# Patient Record
Sex: FEMALE | Race: WHITE | NOT HISPANIC OR LATINO | Employment: FULL TIME | URBAN - NONMETROPOLITAN AREA
[De-identification: names, ages, dates, MRNs, and addresses within clinical notes are randomized per-mention and may not be internally consistent; named-entity substitution may affect disease eponyms.]

---

## 2018-08-27 ENCOUNTER — TRANSCRIBE ORDERS (OUTPATIENT)
Dept: ADMINISTRATIVE | Facility: HOSPITAL | Age: 28
End: 2018-08-27

## 2018-08-27 ENCOUNTER — HOSPITAL ENCOUNTER (OUTPATIENT)
Dept: GENERAL RADIOLOGY | Facility: HOSPITAL | Age: 28
Discharge: HOME | End: 2018-08-27
Admitting: NURSE PRACTITIONER

## 2018-08-27 DIAGNOSIS — M54.9 MID BACK PAIN: Primary | ICD-10-CM

## 2018-08-27 DIAGNOSIS — M54.9 MID BACK PAIN: ICD-10-CM

## 2018-08-27 PROCEDURE — 72072 X-RAY EXAM THORAC SPINE 3VWS: CPT

## 2018-08-27 PROCEDURE — 72072 X-RAY EXAM THORAC SPINE 3VWS: CPT | Performed by: RADIOLOGY

## 2018-09-12 ENCOUNTER — TRANSCRIBE ORDERS (OUTPATIENT)
Dept: PHYSICAL THERAPY | Facility: HOSPITAL | Age: 28
End: 2018-09-12

## 2018-09-12 DIAGNOSIS — M54.6 THORACIC BACK PAIN, UNSPECIFIED BACK PAIN LATERALITY, UNSPECIFIED CHRONICITY: Primary | ICD-10-CM

## 2018-09-14 ENCOUNTER — HOSPITAL ENCOUNTER (OUTPATIENT)
Dept: PHYSICAL THERAPY | Facility: HOSPITAL | Age: 28
Setting detail: THERAPIES SERIES
Discharge: HOME | End: 2018-09-14

## 2018-09-14 DIAGNOSIS — M54.6 PAIN IN THORACIC SPINE: Primary | ICD-10-CM

## 2018-09-14 PROCEDURE — 97162 PT EVAL MOD COMPLEX 30 MIN: CPT | Performed by: PHYSICAL THERAPIST

## 2018-09-14 NOTE — THERAPY EVALUATION
"    Outpatient Physical Therapy Ortho Initial Evaluation   J Luis     Patient Name: Iris Jiménez  : 1990  MRN: [id number]  Today's Date: 2018      Visit Date: 2018    There is no problem list on file for this patient.       History reviewed. No pertinent past medical history.     History reviewed. No pertinent surgical history.    Visit Dx:     ICD-10-CM ICD-9-CM   1. Pain in thoracic spine M54.6 724.1             Patient History     Row Name 18 1100             History    Chief Complaint Pain  -AD      Type of Pain Back pain  -AD      Date Current Problem(s) Began --   Approximately one year.  -AD      Brief Description of Current Complaint Pt reported mid back pain beginning approximately one year ago. She reported the pain was initially intermittent but has progressed to a constant ache. She stated she has difficulty with prolonged standing at work and lifting light objects. She stated she had an x-ray performed approximately two weeks ago which was negative for fractures.She stated she has also experienced pain in the left upper extremity which was described as a \"numbness, tingling\".  -AD      Patient/Caregiver Goals Relieve pain;Return to prior level of function;Improve mobility;Improve strength  -AD      Smoking Status Daily smoker  -AD      Patient's Rating of General Health Good  -AD      Hand Dominance right-handed  -AD      Occupation/sports/leisure activities /jhon at [employer].  -AD      Patient seeing anyone else for problem(s)? Physician  -AD      How has patient tried to help current problem? Sitting, resting.  -AD      What clinical tests have you had for this problem? X-ray  -AD      Results of Clinical Tests Negative for fractures.  -AD      Are you or can you be pregnant No  -AD         Pain     Pain Location Back  -AD      Pain at Present 5  -AD      Pain at Best 3  -AD      Pain at Worst 8  -AD      Pain Frequency Constant/continuous  -AD      Pain " Description Aching  -AD      What Performance Factors Make the Current Problem(s) WORSE? Prolonged standing, lifting.  -AD      What Performance Factors Make the Current Problem(s) BETTER? Rest, lying down, sitting.  -AD      Tolerance Time- Standing 30 minutes  -AD      Tolerance Time- Sitting 60 minutes  -AD      Is your sleep disturbed? No  -AD      Is medication used to assist with sleep? No  -AD      Difficulties at work? Prolonged standing, lifting.  -AD      Difficulties with ADL's? No difficulty  -AD         Fall Risk Assessment    Any falls in the past year: Yes  -AD      Number of falls reported in the last 12 months 3  -AD      Factors that contributed to the fall: Lost balance  -AD         Daily Activities    Primary Language English  -AD      Are you able to read Yes  -AD      Are you able to write Yes  -AD      How does patient learn best? Listening  -AD      Pt Participated in POC and Goals Yes  -AD         Safety    Are you being hurt, hit, or frightened by anyone at home or in your life? No  -AD      Are you being neglected by a caregiver No  -AD        User Key  (r) = Recorded By, (t) = Taken By, (c) = Cosigned By    Initials Name Provider Type    AD Dalton, Ashley Claudene, PT Physical Therapist                PT Ortho     Row Name 09/14/18 1100       Posture/Observations    Posture- WNL --   Flexed forward posture.  -AD       DTR- Upper Quarter Clearing    Biceps (C5/6) Bilateral:;2- Normal response  -AD    Brachioradialis (C6) Bilateral:;2- Normal response  -AD    Triceps (C7) Bilateral:;2- Normal response  -AD       Neural Tension Signs- Upper Quarter Clearing    ULNTT 1 Left:;Postive  -AD    ULNTT 2 Left:;Negative  -AD    ULNTT 3 Left:;Postive  -AD    ULNTT 4 Left:;Negative  -AD       Sensory Screen for Light Touch- Upper Quarter Clearing    C4 (posterior shoulder) Bilateral:;Intact  -AD    C5 (lateral upper arm) Bilateral:;Intact  -AD    C6 (tip of thumb) Bilateral:;Intact  -AD    C7 (tip of  3rd finger) Bilateral:;Intact  -AD    C8 (tip of 5th finger) Bilateral:;Intact  -AD    T1 (medial lower arm) Bilateral:;Intact  -AD       Myotomal Screen- Upper Quarter Clearing    Shoulder flexion (C5) Bilateral:;4- (Good -)  -AD    Elbow flexion/wrist extension (C6) Bilateral:;4- (Good -)  -AD    Elbow extension/wrist flexion (C7) Bilateral:;4- (Good -)  -AD       Cervical/Shoulder ROM Screen    Cervical flexion Impaired   30  -AD    Cervical extension Impaired   15  -AD    Cervical lateral flexion --   Right: 30, Left: 20  -AD    Cervical rotation --   60 degrees bilaterally  -AD       DTR- Lower Quarter Clearing    Patellar tendon (L2-4) Bilateral:;2- Normal response  -AD    Achilles tendon (S1-2) Bilateral:;2- Normal response  -AD       Neural Tension Signs- Lower Quarter Clearing    Slump Bilateral:;Negative  -AD    SLR Bilateral:;Negative  -AD       Pathological Reflexes- Lower Quarter Clearing    Clonus Bilateral:;Negative  -AD       Sensory Screen for Light Touch- Lower Quarter Clearing    L1 (inguinal area) Bilateral:;Intact  -AD    L2 (anterior mid thigh) Bilateral:;Intact  -AD    L3 (distal anterior thigh) Bilateral:;Intact  -AD    L4 (medial lower leg/foot) Bilateral:;Intact  -AD    L5 (lateral lower leg/great toe) Bilateral:;Intact  -AD    S1 (bottom of foot) Bilateral:;Intact  -AD       Myotomal Screen- Lower Quarter Clearing    Hip flexion (L2) Bilateral:;4 (Good)  -AD    Knee extension (L3) Bilateral:;4 (Good)  -AD    Ankle DF (L4) Bilateral:;4 (Good)  -AD    Ankle PF (S1) Bilateral:;4 (Good)  -AD    Knee flexion (S2) Bilateral:;4 (Good)  -AD       Lumbar ROM Screen- Lower Quarter Clearing    Lumbar Flexion Normal  -AD    Lumbar Extension --   50%  -AD    Lumbar Lateral Flexion Normal  -AD    Lumbar Rotation Normal  -AD       SI/Hip Screen- Lower Quarter Clearing    ASIS compression Positive  -AD    ASIS distraction Negative  -AD    Miquel's/Alexis's test Bilateral:;Positive  -AD    Posterior thigh  sheer Bilateral:;Positive  -AD       Special Tests/Palpation    Special Tests/Palpation --   T8 tenderness, hypomobility, relieved with joint mob  -AD       Cervical Palpation    Cervical Palpation- Location? Upper traps;Spinous process  -AD    Spinous Process Tender   T8-T10, tender and hypomobile.  -AD    Upper Traps Bilateral:;Tender;Guarded/taut  -AD       Thoracic Accessory Motions    Thoracic Accessory Motions Tested? --   T8 tender, hypomobile  -AD       Cervical/Thoracic Special Tests    Spurlings (Foraminal Compression) Left:;Positive  -AD    Cervical Compression (Forarminal Compression vs. Facet Pain) Positive  -AD    Cervical Distraction (Foraminal Compression vs. Facet Pain) Negative  -AD       Sensation    Sensation WNL? WNL  -AD       Gait/Stairs Assessment/Training    Comment (Gait/Stairs) No gait abnormalities observed.  -AD      User Key  (r) = Recorded By, (t) = Taken By, (c) = Cosigned By    Initials Name Provider Type    AD Dalton, Ashley Claudene, PT Physical Therapist                      Therapy Education  Given: HEP, Symptoms/condition management, Pain management, Posture/body mechanics, Mobility training  Program: New  How Provided: Verbal, Demonstration, Written  Provided to: Patient  Level of Understanding: Teach back education performed, Verbalized, Demonstrated           PT OP Goals     Row Name 09/14/18 1300          PT Short Term Goals    STG Date to Achieve 09/28/18  -AD     STG 1 Pt will be instructed in HEP for improved functional independence.  -AD     STG 1 Progress New  -AD     STG 2 Pt will report a maximum of 5/10 thoracic pain for improved function.  -AD     STG 2 Progress New  -AD     STG 3 Pt will demonstrate a 5 degree improvement in cervical ROM for improved function.  -AD     STG 3 Progress New  -AD        Long Term Goals    LTG Date to Achieve 10/14/18  -AD     LTG 1 Pt will demonstrate 5/5 BUE and BLE strength for improved function.  -AD     LTG 1 Progress New  -AD      LTG 2 Pt will report a 75% improvement in left upper extremity radicular pain for improved function.  -AD     LTG 2 Progress New  -AD     LTG 3 Pt will report less than 15% impairment on the Modified Oswestry for improved functional independence.  -AD     LTG 3 Progress New  -AD     LTG 4 Pt will report mild tenderness to palpation of the thoracic region with mild muscle guarding.  -AD     LTG 4 Progress New  -AD     LTG 5 Pt will report the ability to work a full shift at [employer] with no more than 2/10 back pain.  -AD     LTG 5 Progress New  -AD        Time Calculation    PT Goal Re-Cert Due Date 10/14/18  -AD       User Key  (r) = Recorded By, (t) = Taken By, (c) = Cosigned By    Initials Name Provider Type    AD Dalton, Ashley Claudene, PT Physical Therapist                PT Assessment/Plan     Row Name 09/14/18 1319          PT Assessment    Functional Limitations Decreased safety during functional activities;Limitation in home management;Limitations in community activities;Performance in work activities;Performance in self-care ADL;Performance in leisure activities;Limitations in functional capacity and performance  -AD     Impairments Balance;Joint mobility;Joint integrity;Muscle strength;Pain;Poor body mechanics;Posture;Range of motion;Sensation  -AD     Assessment Comments The patient is a 28 year old female presenting to the clinic with thoracic and left upper extremity pain. The patient denied cervical pain, however upon assessment special tests including Spurlings and ULTT were positive for possible nerve impingement. She demonstrated impaired cervical ROM, bilateral upper and lower extremity weakness, and reported left UE radicular pain.  She was tender to papation of T8 spinous process which was also hypomobile upon assessment. She would benefit from skilled physical therapy to address functional limitations and impairments. She will be progressed as tolerated to improve level of function.  -AD      Please refer to paper survey for additional self-reported information Yes  -AD     Rehab Potential Good  -AD     Patient/caregiver participated in establishment of treatment plan and goals Yes  -AD     Patient would benefit from skilled therapy intervention Yes  -AD        PT Plan    PT Frequency 2x/week  -AD     Predicted Duration of Therapy Intervention (Therapy Eval) 4 weeks  -AD     Planned CPT's? PT EVAL MOD COMPLELITY: 60101;PT RE-EVAL: 97253;PT NEUROMUSC RE-EDUCATION EA 15 MIN: 43468;PT MANUAL THERAPY EA 15 MIN: 09263;PT THER ACT EA 15 MIN: 91726;PT THER PROC EA 15 MIN: 42697;PT SELF CARE/HOME MGMT/TRAIN EA 15: 12451;PT ULTRASOUND EA 15 MIN: 09370;PT ELECTRICAL STIM ATTD EA 15 MIN: 29839;PT ELECTRICAL STIM UNATTEND: ;PT TRACTION CERVICAL: 32176;PT HOT/COLD PACK WC NONMCARE: 81932;PT IONTOPHORESIS EA 15 MIN: 14142;PT THER SUPP EA 15 MIN  -AD     Physical Therapy Interventions (Optional Details) balance training;gross motor skills;home exercise program;patient/family education;neuromuscular re-education;motor coordination training;modalities;manual therapy techniques;lumbar stabilization;joint mobilization;postural re-education;ROM (Range of Motion);strengthening;stretching;transfer training  -AD     PT Plan Comments The above noted interventions will be used to address functional limitations and impairments. She will be progressed as tolerated.  -AD       User Key  (r) = Recorded By, (t) = Taken By, (c) = Cosigned By    Initials Name Provider Type    AD Dalton, Ashley Claudene, RAUL Physical Therapist                  Exercises     Row Name 09/14/18 1300             Exercise 1    Exercise Name 1 HEP: scapular retraction, UT stretch, levator scapula stretch.  -AD      Cueing 1 Verbal;Tactile;Demo  -AD        User Key  (r) = Recorded By, (t) = Taken By, (c) = Cosigned By    Initials Name Provider Type    AD Dalton, Ashley Claudene, RAUL Physical Therapist                        Outcome Measure Options: Modifed  Owestry  Modified Oswestry  Modified Oswestry Score/Comments: 14/50 = 28% impaired      Time Calculation:     Therapy Suggested Charges     Code   Minutes Charges    None             Start Time: 1100  Stop Time: 1200  Time Calculation (min): 60 min     Therapy Charges for Today     Code Description Service Date Service Provider Modifiers Qty    [id number] HC PT EVAL MOD COMPLEXITY 4 9/14/2018 Dalton, Ashley Claudene, PT GP 1          PT G-Codes  Outcome Measure Options: Modifed Owestry  Modified Oswestry Score/Comments: 14/50 = 28% impaired         Ashley Claudene Dalton, PT  9/14/2018

## 2018-09-21 ENCOUNTER — APPOINTMENT (OUTPATIENT)
Dept: PHYSICAL THERAPY | Facility: HOSPITAL | Age: 28
End: 2018-09-21

## 2022-05-14 ENCOUNTER — HOSPITAL ENCOUNTER (EMERGENCY)
Facility: HOSPITAL | Age: 32
Discharge: HOME | End: 2022-05-14
Admitting: EMERGENCY MEDICINE

## 2022-05-14 VITALS
SYSTOLIC BLOOD PRESSURE: 130 MMHG | OXYGEN SATURATION: 100 % | DIASTOLIC BLOOD PRESSURE: 82 MMHG | WEIGHT: 199 LBS | BODY MASS INDEX: 33.15 KG/M2 | HEIGHT: 65 IN | TEMPERATURE: 97.8 F | HEART RATE: 80 BPM | RESPIRATION RATE: 16 BRPM

## 2022-05-14 DIAGNOSIS — H66.003 ACUTE SUPPURATIVE OTITIS MEDIA OF BOTH EARS WITHOUT SPONTANEOUS RUPTURE OF TYMPANIC MEMBRANES, RECURRENCE NOT SPECIFIED: Primary | ICD-10-CM

## 2022-05-14 PROCEDURE — 25010000002 KETOROLAC TROMETHAMINE PER 15 MG: Performed by: PHYSICIAN ASSISTANT

## 2022-05-14 PROCEDURE — 25010000002 CEFTRIAXONE PER 250 MG: Performed by: PHYSICIAN ASSISTANT

## 2022-05-14 PROCEDURE — 96372 THER/PROPH/DIAG INJ SC/IM: CPT

## 2022-05-14 PROCEDURE — 99283 EMERGENCY DEPT VISIT LOW MDM: CPT

## 2022-05-14 RX ORDER — IBUPROFEN 800 MG/1
800 TABLET ORAL EVERY 6 HOURS PRN
Qty: 30 TABLET | Refills: 0 | Status: SHIPPED | OUTPATIENT
Start: 2022-05-14

## 2022-05-14 RX ORDER — CEFDINIR 300 MG/1
300 CAPSULE ORAL 2 TIMES DAILY
Qty: 20 CAPSULE | Refills: 0 | Status: SHIPPED | OUTPATIENT
Start: 2022-05-14 | End: 2022-05-24

## 2022-05-14 RX ORDER — KETOROLAC TROMETHAMINE 30 MG/ML
60 INJECTION, SOLUTION INTRAMUSCULAR; INTRAVENOUS ONCE
Status: COMPLETED | OUTPATIENT
Start: 2022-05-14 | End: 2022-05-14

## 2022-05-14 RX ADMIN — LIDOCAINE HYDROCHLORIDE 1 G: 10 INJECTION, SOLUTION EPIDURAL; INFILTRATION; INTRACAUDAL; PERINEURAL at 21:36

## 2022-05-14 RX ADMIN — KETOROLAC TROMETHAMINE 60 MG: 60 INJECTION, SOLUTION INTRAMUSCULAR at 21:36

## 2022-05-15 NOTE — ED PROVIDER NOTES
Subjective   30 YO FEMALE PATIENT PRESENTS TO THE ED WITH COMPLAINTS OF BILATERAL EARACHE. PT ALSO REPORTS SWELLING TO ANTERIOR CERVICAL LYMPH NODE. PATIENT STATES PAIN STARTED TODAY. NO OTHER SYMPTOMS. DENIES ANY FEVERS. DENIES ANY PAIN BEHIND THE EAR.       History provided by:  Patient   used: No        Review of Systems   Constitutional: Negative.    HENT: Positive for ear pain.    Eyes: Negative.    Respiratory: Negative.    Cardiovascular: Negative.    Gastrointestinal: Negative.    Endocrine: Negative.    Genitourinary: Negative.    Musculoskeletal: Negative.    Skin: Negative.    Allergic/Immunologic: Negative.    Neurological: Negative.    Hematological: Negative.    Psychiatric/Behavioral: Negative.    All other systems reviewed and are negative.      No past medical history on file.    No Known Allergies    No past surgical history on file.    No family history on file.    Social History     Socioeconomic History   • Marital status:    Tobacco Use   • Smoking status: Current Every Day Smoker           Objective   Physical Exam  Vitals and nursing note reviewed.   Constitutional:       Appearance: Normal appearance. She is normal weight.   HENT:      Head: Normocephalic and atraumatic.      Right Ear: Hearing, ear canal and external ear normal. Tympanic membrane is erythematous and bulging.      Left Ear: Hearing, ear canal and external ear normal. Tympanic membrane is erythematous.      Nose: Nose normal.      Mouth/Throat:      Mouth: Mucous membranes are moist.      Pharynx: Oropharynx is clear.   Eyes:      Extraocular Movements: Extraocular movements intact.      Conjunctiva/sclera: Conjunctivae normal.      Pupils: Pupils are equal, round, and reactive to light.   Neck:      Comments: RIGHT ANTERIOR CERVICAL ADENOPATHY. NO PAIN BEHIND THE EAR. NO MASTOID TENDERNESS. FULL ROM. NEG MENINGEAL SIGNS.    Cardiovascular:      Rate and Rhythm: Normal rate and regular rhythm.       Pulses: Normal pulses.      Heart sounds: Normal heart sounds.   Pulmonary:      Effort: Pulmonary effort is normal.      Breath sounds: Normal breath sounds.   Abdominal:      General: Abdomen is flat. Bowel sounds are normal.      Palpations: Abdomen is soft.   Musculoskeletal:      Cervical back: Normal range of motion and neck supple.   Lymphadenopathy:      Cervical: Cervical adenopathy present.   Neurological:      Mental Status: She is alert.         Procedures           ED Course  ED Course as of 05/14/22 2234   Sat May 14, 2022   2233 PT INSTRUCTED TO F/U WITH PCP DISCUSSED SX THAT WOULD WARRANT RETURN TO THE ED.  [ML]      ED Course User Index  [ML] Armida Hickman PA                                                 MDM  Number of Diagnoses or Management Options  Risk of Complications, Morbidity, and/or Mortality  Presenting problems: low  Diagnostic procedures: low  Management options: low    Patient Progress  Patient progress: improved      Final diagnoses:   Acute suppurative otitis media of both ears without spontaneous rupture of tympanic membranes, recurrence not specified       ED Disposition  ED Disposition     ED Disposition   Discharge    Condition   Stable    Comment   --             Herminia Wilks, JOSEFINA  [address] [facility] [address]  [place] [id number] [phone]    Schedule an appointment as soon as possible for a visit in 1 day           Medication List      New Prescriptions    cefdinir 300 MG capsule  Commonly known as: OMNICEF  Take 1 capsule by mouth 2 (Two) Times a Day for 10 days.     ibuprofen 800 MG tablet  Commonly known as: ADVIL,MOTRIN  Take 1 tablet by mouth Every 6 (Six) Hours As Needed for Mild Pain .           Where to Get Your Medications      These medications were sent to [id number] - [place], [place] - [address] AT [facility] [address] [facility] - [phone]  - [phone]   [address] [address]    Phone:  [phone]   · cefdinir 300 MG capsule  · ibuprofen 800 MG tablet          Armida Hickman PA  05/14/22 1636

## 2022-09-23 ENCOUNTER — TRANSCRIBE ORDERS (OUTPATIENT)
Dept: ADMINISTRATIVE | Facility: HOSPITAL | Age: 32
End: 2022-09-23

## 2022-09-23 ENCOUNTER — HOSPITAL ENCOUNTER (OUTPATIENT)
Dept: RESPIRATORY THERAPY | Facility: HOSPITAL | Age: 32
Discharge: HOME | End: 2022-09-23

## 2022-09-23 DIAGNOSIS — R42 DIZZINESS AND GIDDINESS: ICD-10-CM

## 2022-09-23 DIAGNOSIS — R00.2 PALPITATIONS: ICD-10-CM

## 2022-09-23 DIAGNOSIS — R00.2 PALPITATIONS: Primary | ICD-10-CM

## 2022-09-23 PROCEDURE — 93225 XTRNL ECG REC<48 HRS REC: CPT

## 2022-09-23 PROCEDURE — 93005 ELECTROCARDIOGRAM TRACING: CPT | Performed by: NURSE PRACTITIONER

## 2022-09-24 LAB
QT INTERVAL: 358 MS
QTC INTERVAL: 428 MS

## 2022-09-30 ENCOUNTER — TRANSCRIBE ORDERS (OUTPATIENT)
Dept: ADMINISTRATIVE | Facility: HOSPITAL | Age: 32
End: 2022-09-30

## 2022-09-30 DIAGNOSIS — R74.01 ELEVATION OF LEVELS OF LIVER TRANSAMINASE LEVELS: Primary | ICD-10-CM

## 2022-10-06 PROCEDURE — 93227 XTRNL ECG REC<48 HR R&I: CPT | Performed by: INTERNAL MEDICINE

## 2022-10-10 ENCOUNTER — OFFICE VISIT (OUTPATIENT)
Dept: CARDIOLOGY | Facility: CLINIC | Age: 32
End: 2022-10-10

## 2022-10-10 ENCOUNTER — HOSPITAL ENCOUNTER (OUTPATIENT)
Dept: ULTRASOUND IMAGING | Facility: HOSPITAL | Age: 32
Discharge: HOME | End: 2022-10-10
Admitting: NURSE PRACTITIONER

## 2022-10-10 VITALS
HEIGHT: 65 IN | WEIGHT: 216 LBS | DIASTOLIC BLOOD PRESSURE: 76 MMHG | BODY MASS INDEX: 35.99 KG/M2 | SYSTOLIC BLOOD PRESSURE: 124 MMHG | HEART RATE: 96 BPM | OXYGEN SATURATION: 98 %

## 2022-10-10 DIAGNOSIS — R06.00 DYSPNEA, UNSPECIFIED TYPE: ICD-10-CM

## 2022-10-10 DIAGNOSIS — R07.2 PRECORDIAL PAIN: ICD-10-CM

## 2022-10-10 DIAGNOSIS — R74.01 ELEVATION OF LEVELS OF LIVER TRANSAMINASE LEVELS: ICD-10-CM

## 2022-10-10 DIAGNOSIS — R00.2 PALPITATIONS: Primary | ICD-10-CM

## 2022-10-10 DIAGNOSIS — Z86.2 HISTORY OF ANEMIA: ICD-10-CM

## 2022-10-10 PROCEDURE — 76700 US EXAM ABDOM COMPLETE: CPT

## 2022-10-10 PROCEDURE — 93000 ELECTROCARDIOGRAM COMPLETE: CPT | Performed by: NURSE PRACTITIONER

## 2022-10-10 PROCEDURE — 99203 OFFICE O/P NEW LOW 30 MIN: CPT | Performed by: NURSE PRACTITIONER

## 2022-10-10 PROCEDURE — 76700 US EXAM ABDOM COMPLETE: CPT | Performed by: RADIOLOGY

## 2022-10-10 RX ORDER — SERTRALINE HYDROCHLORIDE 100 MG/1
100 TABLET, FILM COATED ORAL DAILY
COMMUNITY
Start: 2022-09-26

## 2022-10-10 RX ORDER — MAGNESIUM 200 MG
1 TABLET ORAL DAILY
COMMUNITY
Start: 2022-09-11

## 2022-10-10 RX ORDER — ASCORBIC ACID 500 MG
500 TABLET ORAL DAILY
COMMUNITY
Start: 2022-09-11

## 2022-10-10 RX ORDER — DIPHENHYDRAMINE HCL 25 MG/1
25 TABLET ORAL NIGHTLY PRN
COMMUNITY
Start: 2022-10-06

## 2022-10-10 RX ORDER — FERROUS SULFATE 325(65) MG
1 TABLET ORAL DAILY
COMMUNITY
Start: 2022-09-11

## 2022-10-10 RX ORDER — PROGESTERONE 100 MG/1
CAPSULE ORAL
COMMUNITY
Start: 2022-08-11

## 2022-10-10 RX ORDER — ERGOCALCIFEROL 1.25 MG/1
1 CAPSULE ORAL WEEKLY
COMMUNITY
Start: 2022-10-07

## 2022-10-10 RX ORDER — EPINEPHRINE 0.3 MG/.3ML
0.3 INJECTION SUBCUTANEOUS AS NEEDED
COMMUNITY

## 2022-10-10 NOTE — PROGRESS NOTES
"Subjective   Iris Blackburn is a 32 y.o. female.   Chief Complaint   Patient presents with   • Palpitations   • Shortness of Breath     With exertion       History of Present Illness   Iris Blackburn is a 32-year-old female who presents to clinic as a new patient for evaluation of palpitations and dyspnea.      The patient complains of palpitations, which she describes as a racing heartrate, and sometimes experiences chest discomfort separate from her palpitations. She has noticed palpitations while seated or when driving, and she denies noticing worsening palpations with certain times of day or with certain activities. She reports feeling symptomatic every other day. The patient denies excessive caffeine use or stimulant use. She first noticed palpitations a couple of months ago. Metoprolol tartrate 25 mg was previously prescribed to be taken once daily, but she denies starting metoprolol out of concern for potential side effects. The patient also denies being aware of any recent thyroid lab work. She complains of dizziness and lightheadedness associated with her episodes but denies any syncope. She does not monitor heart rate or blood pressure at home but reports previous heart rates at medical appointments has generally been around 130 beats per minute. She recently completed a 24 hour cardiac monitor. Denies history of rheumatic fever.      The patient describes her left-sided chest discomfort as episodes of a \"couple of sharp pains\", usually while seated or when driving, that resolve quickly. She denies any radiating discomfort in her left arm, neck, or back but complains of feeling \"woozy\" with these episodes. She does occasionally experience arm pain but adds that she believes this may be positional due to lying on that side at night. The patient endorses experiencing some nausea associated with chest discomfort but denies any diaphoresis. Family history significant for cardiac disease in her grandmother, " sister diagnosed with tachycardia and hypertension, mother with hypertension. She denies any known family history of cardiac arrhythmias. She is a smoker and is working towards cessation. Her PCP has prescribed Wellbutrin but she hasn't started it yet. Denies previous cardiac work up. She has experienced dyspnea, which she had attributed to deconditioning.     The following portions of the patient's history were reviewed and updated as appropriate: allergies, current medications, past family history, past medical history, past social history, past surgical history and problem list.    Current Outpatient Medications:   •  ascorbic acid (VITAMIN C) 500 MG tablet, Take 1 tablet by mouth Daily., Disp: , Rfl:   •  Banophen 25 MG tablet, Take 1 tablet by mouth At Night As Needed., Disp: , Rfl:   •  Cyanocobalamin (Vitamin B-12) 1000 MCG sublingual tablet, Place 1 tablet under the tongue Daily., Disp: , Rfl:   •  EPINEPHrine (EPIPEN) 0.3 MG/0.3ML solution auto-injector injection, Inject 0.3 mL into the appropriate muscle as directed by prescriber As Needed., Disp: , Rfl:   •  FeroSul 325 (65 Fe) MG tablet, Take 1 tablet by mouth Daily., Disp: , Rfl:   •  Progesterone (PROMETRIUM) 100 MG capsule, TAKE 1 CAPSULE BY MOUTH EVERY NIGHT ON DAYS 14 THROUGH 28 AS DIRECTED, Disp: , Rfl:   •  sertraline (ZOLOFT) 100 MG tablet, Take 1 tablet by mouth Daily., Disp: , Rfl:   •  vitamin D (ERGOCALCIFEROL) 1.25 MG (41938 UT) capsule capsule, Take 1 capsule by mouth 1 (One) Time Per Week., Disp: , Rfl:   •  ibuprofen (ADVIL,MOTRIN) 800 MG tablet, Take 1 tablet by mouth Every 6 (Six) Hours As Needed for Mild Pain ., Disp: 30 tablet, Rfl: 0  •  metoprolol tartrate (LOPRESSOR) 25 MG tablet, Take 1 tablet by mouth Daily., Disp: , Rfl:     Review of Systems   Constitutional: Negative for activity change, appetite change, chills, diaphoresis, fatigue and fever.   HENT: Negative for congestion, drooling, ear discharge, ear pain, mouth sores,  "nosebleeds, postnasal drip, rhinorrhea, sinus pressure, sneezing and sore throat.    Eyes: Negative for pain, discharge and visual disturbance.   Respiratory: Positive for shortness of breath. Negative for cough, chest tightness and wheezing.    Cardiovascular: Positive for chest pain and palpitations. Negative for leg swelling.   Gastrointestinal: Negative for abdominal pain, constipation, diarrhea, nausea and vomiting.   Endocrine: Negative for cold intolerance, heat intolerance, polydipsia, polyphagia and polyuria.   Musculoskeletal: Negative for arthralgias, myalgias and neck pain.   Skin: Negative for rash and wound.   Neurological: Positive for dizziness. Negative for syncope, speech difficulty, weakness, light-headedness and headaches.   Hematological: Negative for adenopathy. Does not bruise/bleed easily.   Psychiatric/Behavioral: Negative for confusion, dysphoric mood and sleep disturbance. The patient is not nervous/anxious.    All other systems reviewed and are negative.    Patient Active Problem List   Diagnosis   • History of anemia   • Palpitations     Past Medical History:   Diagnosis Date   • Anxiety    • Iron deficiency    • PCOS (polycystic ovarian syndrome)      Past Surgical History:   Procedure Laterality Date   • DILATATION AND CURETTAGE       /76 (BP Location: Left arm, Patient Position: Sitting, Cuff Size: Adult)   Pulse 96   Ht 165.1 cm (65\")   Wt 98 kg (216 lb)   SpO2 98%   BMI 35.94 kg/m²     Objective   No Known Allergies    Physical Exam  Vitals reviewed.   Constitutional:       Appearance: Normal appearance. She is well-developed.   HENT:      Head: Normocephalic.   Eyes:      Conjunctiva/sclera: Conjunctivae normal.   Neck:      Vascular: No JVD.   Cardiovascular:      Rate and Rhythm: Normal rate and regular rhythm.   Pulmonary:      Effort: Pulmonary effort is normal.      Breath sounds: Normal breath sounds.   Musculoskeletal:      Cervical back: Neck supple.      Right " lower leg: No edema.      Left lower leg: No edema.   Skin:     General: Skin is warm and dry.   Neurological:      Mental Status: She is alert and oriented to person, place, and time.   Psychiatric:         Attention and Perception: Attention normal.         Mood and Affect: Mood normal.         Speech: Speech normal.         Behavior: Behavior normal. Behavior is cooperative.         Cognition and Memory: Cognition normal.       Lab Result 09- 09:22 AM  MAGNESIUM 2.3 Normal mg/dL     COMPREHENSIVE METABOLIC PANEL  GLUCOSE 97 Normal mg/dL   UREA NITROGEN (BUN) 12 Normal mg/dL  CREATININE 0.68 Normal mg/dL  EGFR 119 Normal mL/min/1.73m2  SODIUM 139 Normal mmol/L   POTASSIUM 4.7 Normal mmol/L   CHLORIDE 106 Normal mmol/L   CARBON DIOXIDE 25 Normal mmol/L   CALCIUM 9.1 Normal mg/dL   PROTEIN, TOTAL 6.7 Normal   ALBUMIN 4.2 Normal g/dL   GLOBULIN 2.5 Normal g/dL (calc)   ALBUMIN/GLOBULIN RATIO 1.7 Normal (calc)   BILIRUBIN, TOTAL 0.3 Normal mg/dL   ALKALINE PHOSPHATASE 152 Above Normal U/L   AST 44 Above Normal U/L   ALT 69 Above Normal U/L     CBC (INCLUDES DIFF/PLT)  WHITE BLOOD CELL COUNT 12.3 Above Normal Thousand/uL   RED BLOOD CELL COUNT 5.55 Above Normal Million/uL   HEMOGLOBIN 16.5 Above Normal g/dL   HEMATOCRIT 49.7 Above Normal %   MCV 89.5 Normal fL   MCH 29.7 Normal pg   MCHC 33.2 Normal g/dL   RDW 13.3 Normal %   PLATELET COUNT 297 Normal Thousand/uL   MPV 12.0 Normal fL   ABSOLUTE NEUTROPHILS 9053 Above Normal cells/uL   ABSOLUTE LYMPHOCYTES 2374 Normal cells/uL   ABSOLUTE MONOCYTES 627 Normal cells/uL  ABSOLUTE EOSINOPHILS 197 Normal cells/uL   ABSOLUTE BASOPHILS 49 Normal cells/uL   NEUTROPHILS 73.6 Normal %   LYMPHOCYTES 19.3 Normal %   MONOCYTES 5.1 Normal %   EOSINOPHILS 1.6 Normal %   BASOPHILS 0.4 Normal %     FERRITIN 45 Normal ng/mL     VITAMIN B12 390 Normal pg/mL   FOLATE, SERUM 3.3 Below Normal ng/mL       VITAMIN D,25-OH,TOTAL,IA 18 Below Normal ng/mL     URINALYSIS REFLEX  COLOR  YELLOW Normal   APPEARANCE CLEAR Normal   SPECIFIC GRAVITY 1.020 Normal   PH < OR = 5.0 Normal   GLUCOSE NEGATIVE Normal   BILIRUBIN NEGATIVE Normal   KETONES NEGATIVE Normal   OCCULT BLOOD NEGATIVE Normal   PROTEIN NEGATIVE Normal   NITRITE NEGATIVE Normal   LEUKOCYTE ESTERASE Normal    Holter Monitor - 24-hour:  Monitor Procedure    Study Description    Monitor placed on patient by EVELYN Boom on 9/23/2022  at 11:19 EDT.  The monitor was scanned on 9/29/2022. The patient was monitored for 1 days 7 minutes. Indications for this exam include palpitations. Total beats:  260013. Average HR:  87. Min HR:  68. Max HR:  138.     Study Findings    Chest pain was reported during the monitoring period. The predominant rhythm noted during the testing period was sinus rhythm. Premature atrial contractions occured rarely. There was no evidence of atrial arrhythmias. There were no episodes of supraventricular tachycardia. Premature ventricular contractions did not appear during monitoring. There were no episodes of ventricular tachycardia. Sinoatrial node conduction was normal. No atrioventricular block noted.     • The predominant rhythm noted during the testing period was sinus rhythm.  • Average HR: 87. Min HR: 68. Max HR: 138.  • Rare PACs.  • No bradycardia, AV block or long pauses  • Symptoms of chest pain and pressure did not correlate with any diagnostic ST-T wave changes.        ECG 12 Lead    Date/Time: 10/10/2022 1:01 PM  Performed by: Gayatri Sanabria APRN  Authorized by: Gayatri Sanabria APRN   Comparison: compared with previous ECG   Similar to previous ECG  Comparison to previous ECG: No significant changes as compared to 9/23/2022  Rhythm: sinus rhythm  Rate: normal  BPM: 96  Conduction: non-specific intraventricular conduction delay  Other findings: left atrial abnormality    Clinical impression: non-specific ECG  Comments: QT/QTc - 337/390            Assessment & Plan   Diagnoses and all orders for this  visit:    1. Palpitations (Primary)  Assessment & Plan:  EKG, reviewed and discussed  Reviewed and discussed Holter monitor  Discussed risks and benefits of metoprolol, she elects to proceed. Recommended metoprolol tartrate 12.5 mg twice daily with close monitoring of heart rate and blood pressure. We discussed hold parameters.   Recommend avoidance of caffeine.   Labs to check thyroid function studies    Orders:  -     Adult Transthoracic Echo Complete W/ Cont if Necessary Per Protocol; Future  -     TSH; Future  -     T4, Free; Future  -     ECG 12 Lead    2. Precordial pain  -     Treadmill Stress Test; Future  -     ECG 12 Lead  Further testing will be arranged   Advised if new or worsening symptoms while awaiting work-up go to the ER.  She expresses understanding    3. Dyspnea, unspecified type  -     Adult Transthoracic Echo Complete W/ Cont if Necessary Per Protocol; Future  Further testing will be arranged    4. History of anemia  Assessment & Plan:  Currently being treated with iron and managed by her primary care provider. Appears to be stable from most recently laboratory studies.          Review of medical record  Follow-up in 4 weeks, sooner as needed.            Transcribed from ambient dictation for JOSEFINA Beck by Lucius Medellin.  10/10/22   13:03 EDT    Patient or patient representative verbalized consent to the visit recording.  I have personally performed the services described in this document as transcribed by the above individual, and it is both accurate and complete.

## 2022-10-10 NOTE — ASSESSMENT & PLAN NOTE
EKG, read and discussed. We discussed risks and benefits of metoprolol. She elects to proceed. Recommended trying metoprolol tartrate 12.5 mg twice daily with close monitoring of heart rate and blood pressure. We discussed hold parameters. Recommend avoidance of caffeine. We will also check thyroid studies to rule out hypothyroidism.

## 2022-10-13 ENCOUNTER — PATIENT ROUNDING (BHMG ONLY) (OUTPATIENT)
Dept: CARDIOLOGY | Facility: CLINIC | Age: 32
End: 2022-10-13

## 2022-10-13 NOTE — PROGRESS NOTES
October 13, 2022    Hello, may I speak with Iris Blackburn?    My name is Karl       I am  with [facility]  [facility]  [address] [place] [address] [phone].    Before we get started may I verify your date of birth? 1990    I am calling to officially welcome you to our practice and ask about your recent visit. Is this a good time to talk? YES    Tell me about your visit with us. What things went well?  Everything went fine. Everyone was nice       We're always looking for ways to make our patients' experiences even better. Do you have recommendations on ways we may improve?  NO    Overall were you satisfied with your first visit to our practice? YES        I appreciate you taking the time to speak with me today. Is there anything else I can do for you? NO    Thank you, and have a great day.

## 2023-02-25 ENCOUNTER — NURSE TRIAGE (OUTPATIENT)
Dept: CALL CENTER | Facility: HOSPITAL | Age: 33
End: 2023-02-25
Payer: COMMERCIAL

## 2023-02-26 ENCOUNTER — APPOINTMENT (OUTPATIENT)
Dept: GENERAL RADIOLOGY | Facility: HOSPITAL | Age: 33
End: 2023-02-26
Payer: COMMERCIAL

## 2023-02-26 ENCOUNTER — HOSPITAL ENCOUNTER (EMERGENCY)
Facility: HOSPITAL | Age: 33
Discharge: HOME | End: 2023-02-27
Attending: EMERGENCY MEDICINE | Admitting: EMERGENCY MEDICINE
Payer: COMMERCIAL

## 2023-02-26 DIAGNOSIS — R07.89 NON-CARDIAC CHEST PAIN: Primary | ICD-10-CM

## 2023-02-26 LAB
B-HCG UR QL: NEGATIVE
TROPONIN T SERPL HS-MCNC: <6 NG/L

## 2023-02-26 PROCEDURE — 93005 ELECTROCARDIOGRAM TRACING: CPT | Performed by: EMERGENCY MEDICINE

## 2023-02-26 PROCEDURE — 81025 URINE PREGNANCY TEST: CPT | Performed by: PHYSICIAN ASSISTANT

## 2023-02-26 PROCEDURE — 84484 ASSAY OF TROPONIN QUANT: CPT | Performed by: PHYSICIAN ASSISTANT

## 2023-02-26 PROCEDURE — 99284 EMERGENCY DEPT VISIT MOD MDM: CPT

## 2023-02-26 PROCEDURE — 93010 ELECTROCARDIOGRAM REPORT: CPT | Performed by: SPECIALIST

## 2023-02-26 PROCEDURE — 96372 THER/PROPH/DIAG INJ SC/IM: CPT

## 2023-02-26 PROCEDURE — 25010000002 KETOROLAC TROMETHAMINE PER 15 MG: Performed by: PHYSICIAN ASSISTANT

## 2023-02-26 PROCEDURE — 36415 COLL VENOUS BLD VENIPUNCTURE: CPT

## 2023-02-26 PROCEDURE — 71045 X-RAY EXAM CHEST 1 VIEW: CPT

## 2023-02-26 RX ORDER — KETOROLAC TROMETHAMINE 30 MG/ML
60 INJECTION, SOLUTION INTRAMUSCULAR; INTRAVENOUS ONCE
Status: COMPLETED | OUTPATIENT
Start: 2023-02-26 | End: 2023-02-26

## 2023-02-26 RX ADMIN — KETOROLAC TROMETHAMINE 60 MG: 60 INJECTION, SOLUTION INTRAMUSCULAR at 23:10

## 2023-02-27 VITALS
SYSTOLIC BLOOD PRESSURE: 129 MMHG | HEART RATE: 104 BPM | BODY MASS INDEX: 33.99 KG/M2 | RESPIRATION RATE: 18 BRPM | WEIGHT: 204 LBS | HEIGHT: 65 IN | OXYGEN SATURATION: 97 % | TEMPERATURE: 97.3 F | DIASTOLIC BLOOD PRESSURE: 79 MMHG

## 2023-02-27 LAB
QT INTERVAL: 312 MS
QTC INTERVAL: 431 MS

## 2023-02-27 RX ORDER — CYCLOBENZAPRINE HCL 10 MG
10 TABLET ORAL 2 TIMES DAILY PRN
Qty: 20 TABLET | Refills: 0 | Status: SHIPPED | OUTPATIENT
Start: 2023-02-27

## 2023-02-27 RX ORDER — IBUPROFEN 800 MG/1
800 TABLET ORAL EVERY 6 HOURS PRN
Qty: 30 TABLET | Refills: 0 | Status: SHIPPED | OUTPATIENT
Start: 2023-02-27

## 2023-09-10 ENCOUNTER — NURSE TRIAGE (OUTPATIENT)
Dept: CALL CENTER | Facility: HOSPITAL | Age: 33
End: 2023-09-10
Payer: COMMERCIAL

## 2023-09-10 ENCOUNTER — HOSPITAL ENCOUNTER (EMERGENCY)
Facility: HOSPITAL | Age: 33
Discharge: HOME | End: 2023-09-11
Attending: EMERGENCY MEDICINE

## 2023-09-10 ENCOUNTER — APPOINTMENT (OUTPATIENT)
Dept: GENERAL RADIOLOGY | Facility: HOSPITAL | Age: 33
End: 2023-09-10

## 2023-09-10 DIAGNOSIS — R42 DIZZINESS: Primary | ICD-10-CM

## 2023-09-10 LAB
ALBUMIN SERPL-MCNC: 4 G/DL (ref 3.5–5.2)
ALBUMIN/GLOB SERPL: 1.4 G/DL
ALP SERPL-CCNC: 127 U/L (ref 39–117)
ALT SERPL W P-5'-P-CCNC: 51 U/L (ref 1–33)
ANION GAP SERPL CALCULATED.3IONS-SCNC: 12.4 MMOL/L (ref 5–15)
AST SERPL-CCNC: 33 U/L (ref 1–32)
BASOPHILS # BLD AUTO: 0.05 10*3/MM3 (ref 0–0.2)
BASOPHILS NFR BLD AUTO: 0.3 % (ref 0–1.5)
BILIRUB SERPL-MCNC: 0.2 MG/DL (ref 0–1.2)
BUN SERPL-MCNC: 10 MG/DL (ref 6–20)
BUN/CREAT SERPL: 15.2 (ref 7–25)
CALCIUM SPEC-SCNC: 8.9 MG/DL (ref 8.6–10.5)
CHLORIDE SERPL-SCNC: 109 MMOL/L (ref 98–107)
CK SERPL-CCNC: 110 U/L (ref 20–180)
CO2 SERPL-SCNC: 23.6 MMOL/L (ref 22–29)
CREAT SERPL-MCNC: 0.66 MG/DL (ref 0.57–1)
CRP SERPL-MCNC: 0.59 MG/DL (ref 0–0.5)
DEPRECATED RDW RBC AUTO: 43.7 FL (ref 37–54)
EGFRCR SERPLBLD CKD-EPI 2021: 119.7 ML/MIN/1.73
EOSINOPHIL # BLD AUTO: 0.23 10*3/MM3 (ref 0–0.4)
EOSINOPHIL NFR BLD AUTO: 1.5 % (ref 0.3–6.2)
ERYTHROCYTE [DISTWIDTH] IN BLOOD BY AUTOMATED COUNT: 13.6 % (ref 12.3–15.4)
GLOBULIN UR ELPH-MCNC: 2.8 GM/DL
GLUCOSE SERPL-MCNC: 104 MG/DL (ref 65–99)
HCT VFR BLD AUTO: 45.7 % (ref 34–46.6)
HGB BLD-MCNC: 15.1 G/DL (ref 12–15.9)
IMM GRANULOCYTES # BLD AUTO: 0.04 10*3/MM3 (ref 0–0.05)
IMM GRANULOCYTES NFR BLD AUTO: 0.3 % (ref 0–0.5)
LYMPHOCYTES # BLD AUTO: 3.76 10*3/MM3 (ref 0.7–3.1)
LYMPHOCYTES NFR BLD AUTO: 24.9 % (ref 19.6–45.3)
MAGNESIUM SERPL-MCNC: 2.1 MG/DL (ref 1.6–2.6)
MCH RBC QN AUTO: 28.9 PG (ref 26.6–33)
MCHC RBC AUTO-ENTMCNC: 33 G/DL (ref 31.5–35.7)
MCV RBC AUTO: 87.5 FL (ref 79–97)
MONOCYTES # BLD AUTO: 0.75 10*3/MM3 (ref 0.1–0.9)
MONOCYTES NFR BLD AUTO: 5 % (ref 5–12)
NEUTROPHILS NFR BLD AUTO: 10.26 10*3/MM3 (ref 1.7–7)
NEUTROPHILS NFR BLD AUTO: 68 % (ref 42.7–76)
NRBC BLD AUTO-RTO: 0 /100 WBC (ref 0–0.2)
PLATELET # BLD AUTO: 242 10*3/MM3 (ref 140–450)
PMV BLD AUTO: 10.8 FL (ref 6–12)
POTASSIUM SERPL-SCNC: 4.4 MMOL/L (ref 3.5–5.2)
PROT SERPL-MCNC: 6.8 G/DL (ref 6–8.5)
RBC # BLD AUTO: 5.22 10*6/MM3 (ref 3.77–5.28)
SODIUM SERPL-SCNC: 145 MMOL/L (ref 136–145)
TROPONIN T SERPL HS-MCNC: <6 NG/L
TSH SERPL DL<=0.05 MIU/L-ACNC: 5 UIU/ML (ref 0.27–4.2)
WBC NRBC COR # BLD: 15.09 10*3/MM3 (ref 3.4–10.8)

## 2023-09-10 PROCEDURE — 84484 ASSAY OF TROPONIN QUANT: CPT | Performed by: EMERGENCY MEDICINE

## 2023-09-10 PROCEDURE — 96374 THER/PROPH/DIAG INJ IV PUSH: CPT

## 2023-09-10 PROCEDURE — 86140 C-REACTIVE PROTEIN: CPT | Performed by: PHYSICIAN ASSISTANT

## 2023-09-10 PROCEDURE — 25010000002 ONDANSETRON PER 1 MG: Performed by: PHYSICIAN ASSISTANT

## 2023-09-10 PROCEDURE — 25010000002 KETOROLAC TROMETHAMINE PER 15 MG: Performed by: PHYSICIAN ASSISTANT

## 2023-09-10 PROCEDURE — 71046 X-RAY EXAM CHEST 2 VIEWS: CPT

## 2023-09-10 PROCEDURE — 96375 TX/PRO/DX INJ NEW DRUG ADDON: CPT

## 2023-09-10 PROCEDURE — 85025 COMPLETE CBC W/AUTO DIFF WBC: CPT | Performed by: EMERGENCY MEDICINE

## 2023-09-10 PROCEDURE — 99284 EMERGENCY DEPT VISIT MOD MDM: CPT

## 2023-09-10 PROCEDURE — 84443 ASSAY THYROID STIM HORMONE: CPT | Performed by: PHYSICIAN ASSISTANT

## 2023-09-10 PROCEDURE — 93005 ELECTROCARDIOGRAM TRACING: CPT | Performed by: EMERGENCY MEDICINE

## 2023-09-10 PROCEDURE — 36415 COLL VENOUS BLD VENIPUNCTURE: CPT

## 2023-09-10 PROCEDURE — 82550 ASSAY OF CK (CPK): CPT | Performed by: PHYSICIAN ASSISTANT

## 2023-09-10 PROCEDURE — 83735 ASSAY OF MAGNESIUM: CPT | Performed by: PHYSICIAN ASSISTANT

## 2023-09-10 PROCEDURE — 80053 COMPREHEN METABOLIC PANEL: CPT | Performed by: EMERGENCY MEDICINE

## 2023-09-10 PROCEDURE — 93010 ELECTROCARDIOGRAM REPORT: CPT | Performed by: SPECIALIST

## 2023-09-10 RX ORDER — SODIUM CHLORIDE 0.9 % (FLUSH) 0.9 %
10 SYRINGE (ML) INJECTION AS NEEDED
Status: DISCONTINUED | OUTPATIENT
Start: 2023-09-10 | End: 2023-09-11 | Stop reason: HOSPADM

## 2023-09-10 RX ORDER — ASPIRIN 81 MG/1
324 TABLET, CHEWABLE ORAL ONCE
Status: COMPLETED | OUTPATIENT
Start: 2023-09-10 | End: 2023-09-10

## 2023-09-10 RX ORDER — KETOROLAC TROMETHAMINE 30 MG/ML
30 INJECTION, SOLUTION INTRAMUSCULAR; INTRAVENOUS ONCE
Status: COMPLETED | OUTPATIENT
Start: 2023-09-10 | End: 2023-09-10

## 2023-09-10 RX ORDER — ONDANSETRON 2 MG/ML
4 INJECTION INTRAMUSCULAR; INTRAVENOUS ONCE
Status: COMPLETED | OUTPATIENT
Start: 2023-09-10 | End: 2023-09-10

## 2023-09-10 RX ADMIN — KETOROLAC TROMETHAMINE 30 MG: 30 INJECTION, SOLUTION INTRAMUSCULAR at 23:02

## 2023-09-10 RX ADMIN — ASPIRIN 324 MG: 81 TABLET, CHEWABLE ORAL at 23:01

## 2023-09-10 RX ADMIN — ONDANSETRON 4 MG: 2 INJECTION INTRAMUSCULAR; INTRAVENOUS at 23:02

## 2023-09-10 NOTE — TELEPHONE ENCOUNTER
Reason for Disposition   MODERATE dizziness (e.g., interferes with normal activities)  (Exception: Dizziness caused by heat exposure, sudden standing, or poor fluid intake.)    Additional Information   Negative: SEVERE difficulty breathing (e.g., struggling for each breath, speaks in single words)   Negative: Shock suspected (e.g., cold/pale/clammy skin, too weak to stand, low BP, rapid pulse)   Negative: Difficult to awaken or acting confused (e.g., disoriented, slurred speech)   Negative: Fainted, and still feels dizzy afterwards   Negative: Overdose (accidental or intentional) of medications   Negative: New neurologic deficit that is present now: * Weakness of the face, arm, or leg on one side of the body * Numbness of the face, arm, or leg on one side of the body * Loss of speech or garbled speech   Negative: Heart beating < 50 beats per minute OR > 140 beats per minute   Negative: Sounds like a life-threatening emergency to the triager   Negative: Chest pain   Negative: Rectal bleeding, bloody stool, or tarry-black stool   Negative: Vomiting is main symptom   Negative: Diarrhea is main symptom   Negative: Headache is main symptom   Negative: Heat exhaustion suspected (i.e., dehydration from heat exposure)   Negative: Patient states that they are having an anxiety or panic attack   Negative: Dizziness from low blood sugar (i.e., < 60 mg/dl or 3.5 mmol/l)   Negative: SEVERE dizziness (e.g., unable to stand, requires support to walk, feels like passing out now)   Negative: SEVERE headache or neck pain   Negative: Spinning or tilting sensation (vertigo) present now and one or more stroke risk factors (i.e., hypertension, diabetes mellitus, prior stroke/TIA, heart attack, age over 60) (Exception: Prior physician evaluation for this AND no different/worse than usual.)   Negative: Neurologic deficit that was brief (now gone), ANY of the following:* Weakness of the face, arm, or leg on one side of the body* Numbness of  "the face, arm, or leg on one side of the body* Loss of speech or garbled speech   Negative: Loss of vision or double vision  (Exception: Similar to previous migraines.)   Negative: Extra heartbeats, irregular heart beating, or heart is beating very fast (i.e., 'palpitations')   Negative: Difficulty breathing   Negative: Drinking very little and dehydration suspected (e.g., no urine > 12 hours, very dry mouth, very lightheaded)   Negative: Follows bleeding (e.g., stomach, rectum, vagina)  (Exception: Became dizzy from sight of small amount blood.)   Negative: Patient sounds very sick or weak to the triager   Negative: Lightheadedness (dizziness) present now, after 2 hours of rest and fluids   Negative: Spinning or tilting sensation (vertigo) present now   Negative: Fever > 103 F (39.4 C)   Negative: Fever > 100.0 F (37.8 C) and has diabetes mellitus or a weak immune system (e.g., HIV positive, cancer chemotherapy, organ transplant, splenectomy, chronic steroids)    Answer Assessment - Initial Assessment Questions  1. DESCRIPTION: \"Describe your dizziness.\"      Light headed and room spinning  2. LIGHTHEADED: \"Do you feel lightheaded?\" (e.g., somewhat faint, woozy, weak upon standing)      Yes,   3. VERTIGO: \"Do you feel like either you or the room is spinning or tilting?\" (i.e. vertigo)      Room spinning  4. SEVERITY: \"How bad is it?\"  \"Do you feel like you are going to faint?\" \"Can you stand and walk?\"    - MILD: Feels slightly dizzy, but walking normally.    - MODERATE: Feels unsteady when walking, but not falling; interferes with normal activities (e.g., school, work).    - SEVERE: Unable to walk without falling, or requires assistance to walk without falling; feels like passing out now.       Light dizziness per patient  5. ONSET:  \"When did the dizziness begin?\"      Begin this am  6. AGGRAVATING FACTORS: \"Does anything make it worse?\" (e.g., standing, change in head position)      Get up and down, or move head " "up and down  7. HEART RATE: \"Can you tell me your heart rate?\" \"How many beats in 15 seconds?\"  (Note: not all patients can do this)        na  8. CAUSE: \"What do you think is causing the dizziness?\"      Unknow per patient  9. RECURRENT SYMPTOM: \"Have you had dizziness before?\" If Yes, ask: \"When was the last time?\" \"What happened that time?\"      denies  10. OTHER SYMPTOMS: \"Do you have any other symptoms?\" (e.g., fever, chest pain, vomiting, diarrhea, bleeding)        Diarrhea yesterday,   11. PREGNANCY: \"Is there any chance you are pregnant?\" \"When was your last menstrual period?\"        denies    Protocols used: Dizziness-ADULT-OH    "

## 2023-09-11 VITALS
HEIGHT: 65 IN | SYSTOLIC BLOOD PRESSURE: 115 MMHG | HEART RATE: 81 BPM | OXYGEN SATURATION: 97 % | TEMPERATURE: 98.6 F | RESPIRATION RATE: 16 BRPM | WEIGHT: 210 LBS | BODY MASS INDEX: 34.99 KG/M2 | DIASTOLIC BLOOD PRESSURE: 76 MMHG

## 2023-09-11 LAB
HOLD SPECIMEN: NORMAL
HOLD SPECIMEN: NORMAL
QT INTERVAL: 346 MS
QTC INTERVAL: 439 MS
WHOLE BLOOD HOLD COAG: NORMAL
WHOLE BLOOD HOLD SPECIMEN: NORMAL

## 2023-09-11 RX ORDER — ONDANSETRON 4 MG/1
4 TABLET, ORALLY DISINTEGRATING ORAL EVERY 6 HOURS PRN
Qty: 20 TABLET | Refills: 0 | Status: SHIPPED | OUTPATIENT
Start: 2023-09-11

## 2023-09-11 NOTE — ED PROVIDER NOTES
Subjective   History of Present Illness  33-year-old female presents to the ED with chief complaint of headache.  Patient states her headache started today.  Patient has experienced dizziness along with some waxing and waning chest pain.  Patient took some Motrin prior to arrival with little to no relief of symptoms.      Review of Systems   Constitutional: Negative.  Negative for fever.   HENT: Negative.     Respiratory: Negative.     Cardiovascular:  Positive for chest pain.   Gastrointestinal:  Positive for nausea. Negative for abdominal pain.   Endocrine: Negative.    Genitourinary: Negative.  Negative for dysuria.   Skin: Negative.    Neurological:  Positive for dizziness.   Psychiatric/Behavioral: Negative.     All other systems reviewed and are negative.    Past Medical History:   Diagnosis Date    Anxiety     Iron deficiency     PCOS (polycystic ovarian syndrome)        No Known Allergies    Past Surgical History:   Procedure Laterality Date    DILATATION AND CURETTAGE         Family History   Problem Relation Age of Onset    Hyperlipidemia Mother     Hypertension Mother     Diabetes Mother     Hypertension Father     Hypertension Sister     Lupus Sister     Iron deficiency Sister     Hyperlipidemia Maternal Grandmother     Heart failure Maternal Grandmother        Social History     Socioeconomic History    Marital status:    Tobacco Use    Smoking status: Every Day     Packs/day: 1.00     Years: 15.00     Pack years: 15.00     Types: Cigarettes   Vaping Use    Vaping Use: Never used   Substance and Sexual Activity    Alcohol use: Never    Drug use: Never    Sexual activity: Yes     Partners: Male           Objective   Physical Exam  Vitals and nursing note reviewed.   Constitutional:       General: She is not in acute distress.     Appearance: She is well-developed. She is not diaphoretic.   HENT:      Head: Normocephalic and atraumatic.      Right Ear: External ear normal.      Left Ear: External  ear normal.      Nose: Nose normal.   Eyes:      Extraocular Movements: Extraocular movements intact.      Conjunctiva/sclera: Conjunctivae normal.      Pupils: Pupils are equal, round, and reactive to light.   Neck:      Vascular: No JVD.      Trachea: No tracheal deviation.   Cardiovascular:      Rate and Rhythm: Normal rate and regular rhythm.      Heart sounds: Normal heart sounds. No murmur heard.  Pulmonary:      Effort: Pulmonary effort is normal. No respiratory distress.      Breath sounds: Normal breath sounds. No wheezing.   Abdominal:      Palpations: Abdomen is soft.      Tenderness: There is no abdominal tenderness.   Musculoskeletal:         General: No deformity. Normal range of motion.      Cervical back: Normal range of motion and neck supple.   Skin:     General: Skin is warm and dry.      Coloration: Skin is not pale.      Findings: No erythema or rash.   Neurological:      Mental Status: She is alert and oriented to person, place, and time.      Cranial Nerves: No cranial nerve deficit.   Psychiatric:         Behavior: Behavior normal.         Thought Content: Thought content normal.       Procedures           ED Course  ED Course as of 09/11/23 0638   [place] Sep 10, 2023   2224 ECG interpretation:    Vent. Rate :  97 BPM     Atrial Rate :  97 BPM     P-R Int : 150 ms          QRS Dur :  72 ms      QT Int : 346 ms       P-R-T Axes :  46  34  39 degrees     QTc Int : 439 ms     Normal sinus rhythm  Cannot rule out Anterior infarct , age undetermined  Abnormal ECG  When compared with ECG of 26-FEB-2023 21:56,  Minimal criteria for Anterior infarct are now present   [DS]   [place] Sep 11, 2023   0013 CXR rad interpreted:  No plain film evidence of an acute cardiopulmonary process. [RB]      ED Course User Index  [DS] Vicente Garcia MD  [RB] Dennis Torre II, PA                                           Medical Decision Making  33-year-old with dizziness and headache over the last 24 hours.    Problems  Addressed:  Dizziness: complicated acute illness or injury     Details: Patient's work-up is fairly unremarkable.  Patient did respond to the Zofran and the Toradol that she received in the ED.  Patient's headache symptoms have since dissipated.  Encouraged outpatient follow-up for elevated TSH.  Feel like this does not need to be treated over the ED with Synthroid.  Encouraged patient to have a repeat TSH level within the next 6 weeks.    Amount and/or Complexity of Data Reviewed  Labs: ordered.  Radiology: ordered. Decision-making details documented in ED Course.  ECG/medicine tests: ordered.    Risk  OTC drugs.  Prescription drug management.        Final diagnoses:   Dizziness       ED Disposition  ED Disposition       ED Disposition   Discharge    Condition   Stable    Comment   --               Kim Napier, APRN  [id number]  [place] [address] [phone]    Schedule an appointment as soon as possible for a visit            Medication List        New Prescriptions      ondansetron ODT 4 MG disintegrating tablet  Commonly known as: ZOFRAN-ODT  Place 1 tablet on the tongue Every 6 (Six) Hours As Needed for Nausea.               Where to Get Your Medications        These medications were sent to [id number] - [place], [place] - [address] AT SEC [address] [facility] [phone]  - [phone]   [address], [place] [address]      Phone: [phone]   ondansetron ODT 4 MG disintegrating tablet            Dennis Torre II, PA  09/11/23 0020       Dennis Torre II, PA  09/11/23 0638

## 2023-09-11 NOTE — ED PROVIDER NOTES
Subjective   History of Present Illness    Review of Systems    Past Medical History:   Diagnosis Date    Anxiety     Iron deficiency     PCOS (polycystic ovarian syndrome)        No Known Allergies    Past Surgical History:   Procedure Laterality Date    DILATATION AND CURETTAGE         Family History   Problem Relation Age of Onset    Hyperlipidemia Mother     Hypertension Mother     Diabetes Mother     Hypertension Father     Hypertension Sister     Lupus Sister     Iron deficiency Sister     Hyperlipidemia Maternal Grandmother     Heart failure Maternal Grandmother        Social History     Socioeconomic History    Marital status:    Tobacco Use    Smoking status: Every Day     Packs/day: 1.00     Years: 15.00     Pack years: 15.00     Types: Cigarettes   Vaping Use    Vaping Use: Never used   Substance and Sexual Activity    Alcohol use: Never    Drug use: Never    Sexual activity: Yes     Partners: Male           Objective   Physical Exam    Procedures           ED Course  ED Course as of 09/11/23 0345   Sun Sep 10, 2023   2224 ECG interpretation:    Vent. Rate :  97 BPM     Atrial Rate :  97 BPM     P-R Int : 150 ms          QRS Dur :  72 ms      QT Int : 346 ms       P-R-T Axes :  46  34  39 degrees     QTc Int : 439 ms     Normal sinus rhythm  Cannot rule out Anterior infarct , age undetermined  Abnormal ECG  When compared with ECG of 26-FEB-2023 21:56,  Minimal criteria for Anterior infarct are now present   [DS]   Mon Sep 11, 2023   0013 CXR rad interpreted:  No plain film evidence of an acute cardiopulmonary process. [RB]      ED Course User Index  [DS] Vicente Garcia MD  [RB] Dennis Torre II, PA                                           Medical Decision Making  Problems Addressed:  Dizziness: complicated acute illness or injury    Amount and/or Complexity of Data Reviewed  Labs: ordered.  Radiology: ordered.  ECG/medicine tests: ordered.    Risk  OTC drugs.  Prescription drug  management.        Final diagnoses:   Dizziness       ED Disposition  ED Disposition       ED Disposition   Discharge    Condition   Stable    Comment   --               Kim Napier FADY, APRN  [address] [id number] [phone]    Schedule an appointment as soon as possible for a visit            Medication List        New Prescriptions      ondansetron ODT 4 MG disintegrating tablet  Commonly known as: ZOFRAN-ODT  Place 1 tablet on the tongue Every 6 (Six) Hours As Needed for Nausea.               Where to Get Your Medications        These medications were sent to [id number] - [place], [place] - [address] [place] [address] AT [facility] [phone] [facility] [phone] [address] [address]      Phone: [phone]   ondansetron ODT 4 MG disintegrating tablet            Vicente Garcia MD  09/11/23 1682

## 2023-12-28 NOTE — TELEPHONE ENCOUNTER
Caller stated she had a small hard round knot on the underside of her breast. She said it was hard but not sore and not red streaks. Advised her to call her Dr on Monday or go to urgent care tomorrow.   Include Location In Plan?: No Detail Level: Zone Additional Notes (Optional): Patient declined treatment

## 2024-10-18 ENCOUNTER — APPOINTMENT (OUTPATIENT)
Dept: CT IMAGING | Facility: HOSPITAL | Age: 34
End: 2024-10-18
Payer: COMMERCIAL

## 2024-10-18 ENCOUNTER — HOSPITAL ENCOUNTER (EMERGENCY)
Facility: HOSPITAL | Age: 34
Discharge: LEFT BEFORE DISCHARGE | End: 2024-10-19
Payer: COMMERCIAL

## 2024-10-18 VITALS
BODY MASS INDEX: 32.49 KG/M2 | HEIGHT: 65 IN | WEIGHT: 195 LBS | RESPIRATION RATE: 16 BRPM | OXYGEN SATURATION: 100 % | HEART RATE: 101 BPM | SYSTOLIC BLOOD PRESSURE: 150 MMHG | TEMPERATURE: 98 F | DIASTOLIC BLOOD PRESSURE: 98 MMHG

## 2024-10-18 LAB
ALBUMIN SERPL-MCNC: 4 G/DL (ref 3.5–5.2)
ALBUMIN/GLOB SERPL: 1.3 G/DL
ALP SERPL-CCNC: 113 U/L (ref 39–117)
ALT SERPL W P-5'-P-CCNC: 21 U/L (ref 1–33)
ANION GAP SERPL CALCULATED.3IONS-SCNC: 9.4 MMOL/L (ref 5–15)
AST SERPL-CCNC: 25 U/L (ref 1–32)
B-HCG UR QL: NEGATIVE
BASOPHILS # BLD AUTO: 0.06 10*3/MM3 (ref 0–0.2)
BASOPHILS NFR BLD AUTO: 0.3 % (ref 0–1.5)
BILIRUB SERPL-MCNC: 0.2 MG/DL (ref 0–1.2)
BILIRUB UR QL STRIP: NEGATIVE
BUN SERPL-MCNC: 11 MG/DL (ref 6–20)
BUN/CREAT SERPL: 14.3 (ref 7–25)
CALCIUM SPEC-SCNC: 9.1 MG/DL (ref 8.6–10.5)
CHLORIDE SERPL-SCNC: 106 MMOL/L (ref 98–107)
CLARITY UR: CLEAR
CO2 SERPL-SCNC: 24.6 MMOL/L (ref 22–29)
COLOR UR: YELLOW
CREAT SERPL-MCNC: 0.77 MG/DL (ref 0.57–1)
CRP SERPL-MCNC: <0.3 MG/DL (ref 0–0.5)
DEPRECATED RDW RBC AUTO: 46.2 FL (ref 37–54)
EGFRCR SERPLBLD CKD-EPI 2021: 104.6 ML/MIN/1.73
EOSINOPHIL # BLD AUTO: 0.16 10*3/MM3 (ref 0–0.4)
EOSINOPHIL NFR BLD AUTO: 0.8 % (ref 0.3–6.2)
ERYTHROCYTE [DISTWIDTH] IN BLOOD BY AUTOMATED COUNT: 14.2 % (ref 12.3–15.4)
ERYTHROCYTE [SEDIMENTATION RATE] IN BLOOD: 11 MM/HR (ref 0–20)
GLOBULIN UR ELPH-MCNC: 3.1 GM/DL
GLUCOSE SERPL-MCNC: 88 MG/DL (ref 65–99)
GLUCOSE UR STRIP-MCNC: NEGATIVE MG/DL
HCT VFR BLD AUTO: 48.9 % (ref 34–46.6)
HGB BLD-MCNC: 16.2 G/DL (ref 12–15.9)
HGB UR QL STRIP.AUTO: NEGATIVE
HOLD SPECIMEN: NORMAL
HOLD SPECIMEN: NORMAL
IMM GRANULOCYTES # BLD AUTO: 0.1 10*3/MM3 (ref 0–0.05)
IMM GRANULOCYTES NFR BLD AUTO: 0.5 % (ref 0–0.5)
KETONES UR QL STRIP: NEGATIVE
LEUKOCYTE ESTERASE UR QL STRIP.AUTO: NEGATIVE
LIPASE SERPL-CCNC: 20 U/L (ref 13–60)
LYMPHOCYTES # BLD AUTO: 4.39 10*3/MM3 (ref 0.7–3.1)
LYMPHOCYTES NFR BLD AUTO: 22.5 % (ref 19.6–45.3)
MCH RBC QN AUTO: 29.7 PG (ref 26.6–33)
MCHC RBC AUTO-ENTMCNC: 33.1 G/DL (ref 31.5–35.7)
MCV RBC AUTO: 89.6 FL (ref 79–97)
MONOCYTES # BLD AUTO: 1.01 10*3/MM3 (ref 0.1–0.9)
MONOCYTES NFR BLD AUTO: 5.2 % (ref 5–12)
NEUTROPHILS NFR BLD AUTO: 13.76 10*3/MM3 (ref 1.7–7)
NEUTROPHILS NFR BLD AUTO: 70.7 % (ref 42.7–76)
NITRITE UR QL STRIP: NEGATIVE
NRBC BLD AUTO-RTO: 0 /100 WBC (ref 0–0.2)
PH UR STRIP.AUTO: 6.5 [PH] (ref 5–8)
PLATELET # BLD AUTO: 237 10*3/MM3 (ref 140–450)
PMV BLD AUTO: 11.2 FL (ref 6–12)
POTASSIUM SERPL-SCNC: 4.5 MMOL/L (ref 3.5–5.2)
PROT SERPL-MCNC: 7.1 G/DL (ref 6–8.5)
PROT UR QL STRIP: NEGATIVE
RBC # BLD AUTO: 5.46 10*6/MM3 (ref 3.77–5.28)
SODIUM SERPL-SCNC: 140 MMOL/L (ref 136–145)
SP GR UR STRIP: 1.02 (ref 1–1.03)
UROBILINOGEN UR QL STRIP: NORMAL
WBC NRBC COR # BLD AUTO: 19.48 10*3/MM3 (ref 3.4–10.8)
WHOLE BLOOD HOLD COAG: NORMAL
WHOLE BLOOD HOLD SPECIMEN: NORMAL

## 2024-10-18 PROCEDURE — 86140 C-REACTIVE PROTEIN: CPT

## 2024-10-18 PROCEDURE — 81003 URINALYSIS AUTO W/O SCOPE: CPT

## 2024-10-18 PROCEDURE — 36415 COLL VENOUS BLD VENIPUNCTURE: CPT

## 2024-10-18 PROCEDURE — 74176 CT ABD & PELVIS W/O CONTRAST: CPT

## 2024-10-18 PROCEDURE — 81025 URINE PREGNANCY TEST: CPT

## 2024-10-18 PROCEDURE — 99284 EMERGENCY DEPT VISIT MOD MDM: CPT

## 2024-10-18 PROCEDURE — 85652 RBC SED RATE AUTOMATED: CPT

## 2024-10-18 PROCEDURE — 83690 ASSAY OF LIPASE: CPT

## 2024-10-18 PROCEDURE — 99211 OFF/OP EST MAY X REQ PHY/QHP: CPT

## 2024-10-18 PROCEDURE — 80053 COMPREHEN METABOLIC PANEL: CPT

## 2024-10-18 PROCEDURE — 85025 COMPLETE CBC W/AUTO DIFF WBC: CPT

## 2024-10-19 PROCEDURE — 74176 CT ABD & PELVIS W/O CONTRAST: CPT | Performed by: RADIOLOGY

## 2024-10-19 NOTE — ED NOTES
Called pt's name multiple times to room them and gave pt some time to let me know they were here but no one came to triage station to let me know they were there. Pt did not notify me that they were leaving without being seen.

## 2024-10-25 NOTE — PROGRESS NOTES
MEDICAL SCREENING:    Reason for Visit: Abdominal pain with N/V    Patient initially seen in triage.  The patient was advised further evaluation and diagnostic testing will be needed, some of the treatment and testing will be initiated in the lobby in order to begin the process.  The patient will be returned to the waiting area for the time being and possibly be re-assessed by a subsequent ED provider.  The patient will be brought back to the treatment area in as timely manner as possible.

## 2025-06-03 ENCOUNTER — CONSULT (OUTPATIENT)
Dept: ONCOLOGY | Facility: CLINIC | Age: 35
End: 2025-06-03
Payer: COMMERCIAL

## 2025-06-03 ENCOUNTER — TELEPHONE (OUTPATIENT)
Dept: ONCOLOGY | Facility: CLINIC | Age: 35
End: 2025-06-03

## 2025-06-03 ENCOUNTER — LAB (OUTPATIENT)
Dept: ONCOLOGY | Facility: CLINIC | Age: 35
End: 2025-06-03
Payer: COMMERCIAL

## 2025-06-03 VITALS
RESPIRATION RATE: 18 BRPM | BODY MASS INDEX: 28.99 KG/M2 | DIASTOLIC BLOOD PRESSURE: 96 MMHG | SYSTOLIC BLOOD PRESSURE: 133 MMHG | HEART RATE: 89 BPM | WEIGHT: 174 LBS | HEIGHT: 65 IN | OXYGEN SATURATION: 99 % | TEMPERATURE: 97.7 F

## 2025-06-03 DIAGNOSIS — D72.829 LEUKOCYTOSIS, UNSPECIFIED TYPE: Primary | ICD-10-CM

## 2025-06-03 DIAGNOSIS — Z86.2 HISTORY OF ANEMIA: Primary | ICD-10-CM

## 2025-06-03 DIAGNOSIS — D75.1 POLYCYTHEMIA: ICD-10-CM

## 2025-06-03 LAB
ALBUMIN SERPL-MCNC: 4.1 G/DL (ref 3.5–5.2)
ALBUMIN/GLOB SERPL: 1.5 G/DL
ALP SERPL-CCNC: 169 U/L (ref 39–117)
ALT SERPL W P-5'-P-CCNC: 42 U/L (ref 1–33)
ANION GAP SERPL CALCULATED.3IONS-SCNC: 10.5 MMOL/L (ref 5–15)
AST SERPL-CCNC: 23 U/L (ref 1–32)
BASOPHILS # BLD AUTO: 0.05 10*3/MM3 (ref 0–0.2)
BASOPHILS NFR BLD AUTO: 0.4 % (ref 0–1.5)
BILIRUB SERPL-MCNC: 0.3 MG/DL (ref 0–1.2)
BUN SERPL-MCNC: 5.4 MG/DL (ref 6–20)
BUN/CREAT SERPL: 8.1 (ref 7–25)
CALCIUM SPEC-SCNC: 8.5 MG/DL (ref 8.6–10.5)
CHLORIDE SERPL-SCNC: 105 MMOL/L (ref 98–107)
CHROMATIN AB SERPL-ACNC: <10 IU/ML (ref 0–14)
CO2 SERPL-SCNC: 22.5 MMOL/L (ref 22–29)
CREAT SERPL-MCNC: 0.67 MG/DL (ref 0.57–1)
CRP SERPL-MCNC: 0.45 MG/DL (ref 0–0.5)
DEPRECATED RDW RBC AUTO: 43.7 FL (ref 37–54)
EGFRCR SERPLBLD CKD-EPI 2021: 118.5 ML/MIN/1.73
EOSINOPHIL # BLD AUTO: 0.15 10*3/MM3 (ref 0–0.4)
EOSINOPHIL NFR BLD AUTO: 1.3 % (ref 0.3–6.2)
ERYTHROCYTE [DISTWIDTH] IN BLOOD BY AUTOMATED COUNT: 13.4 % (ref 12.3–15.4)
ERYTHROCYTE [SEDIMENTATION RATE] IN BLOOD: 16 MM/HR (ref 0–20)
GLOBULIN UR ELPH-MCNC: 2.8 GM/DL
GLUCOSE SERPL-MCNC: 122 MG/DL (ref 65–99)
HAV IGM SERPL QL IA: NORMAL
HBV CORE IGM SERPL QL IA: NORMAL
HBV SURFACE AG SERPL QL IA: NORMAL
HCT VFR BLD AUTO: 50.1 % (ref 34–46.6)
HCV AB SER QL: NORMAL
HGB BLD-MCNC: 16.6 G/DL (ref 12–15.9)
HIV 1+2 AB+HIV1 P24 AG SERPL QL IA: NORMAL
IMM GRANULOCYTES # BLD AUTO: 0.02 10*3/MM3 (ref 0–0.05)
IMM GRANULOCYTES NFR BLD AUTO: 0.2 % (ref 0–0.5)
LYMPHOCYTES # BLD AUTO: 2.77 10*3/MM3 (ref 0.7–3.1)
LYMPHOCYTES NFR BLD AUTO: 24.4 % (ref 19.6–45.3)
MCH RBC QN AUTO: 29.5 PG (ref 26.6–33)
MCHC RBC AUTO-ENTMCNC: 33.1 G/DL (ref 31.5–35.7)
MCV RBC AUTO: 89 FL (ref 79–97)
MONOCYTES # BLD AUTO: 0.56 10*3/MM3 (ref 0.1–0.9)
MONOCYTES NFR BLD AUTO: 4.9 % (ref 5–12)
NEUTROPHILS NFR BLD AUTO: 68.8 % (ref 42.7–76)
NEUTROPHILS NFR BLD AUTO: 7.78 10*3/MM3 (ref 1.7–7)
NRBC BLD AUTO-RTO: 0 /100 WBC (ref 0–0.2)
PLATELET # BLD AUTO: 220 10*3/MM3 (ref 140–450)
PMV BLD AUTO: 11.5 FL (ref 6–12)
POTASSIUM SERPL-SCNC: 4.5 MMOL/L (ref 3.5–5.2)
PROT SERPL-MCNC: 6.9 G/DL (ref 6–8.5)
RBC # BLD AUTO: 5.63 10*6/MM3 (ref 3.77–5.28)
SODIUM SERPL-SCNC: 138 MMOL/L (ref 136–145)
WBC NRBC COR # BLD AUTO: 11.33 10*3/MM3 (ref 3.4–10.8)

## 2025-06-03 PROCEDURE — G0432 EIA HIV-1/HIV-2 SCREEN: HCPCS | Performed by: NURSE PRACTITIONER

## 2025-06-03 PROCEDURE — 86038 ANTINUCLEAR ANTIBODIES: CPT | Performed by: NURSE PRACTITIONER

## 2025-06-03 PROCEDURE — 86140 C-REACTIVE PROTEIN: CPT | Performed by: NURSE PRACTITIONER

## 2025-06-03 PROCEDURE — 80053 COMPREHEN METABOLIC PANEL: CPT | Performed by: NURSE PRACTITIONER

## 2025-06-03 PROCEDURE — 80074 ACUTE HEPATITIS PANEL: CPT | Performed by: NURSE PRACTITIONER

## 2025-06-03 PROCEDURE — 85025 COMPLETE CBC W/AUTO DIFF WBC: CPT | Performed by: NURSE PRACTITIONER

## 2025-06-03 PROCEDURE — 81479 UNLISTED MOLECULAR PATHOLOGY: CPT | Performed by: NURSE PRACTITIONER

## 2025-06-03 PROCEDURE — 86431 RHEUMATOID FACTOR QUANT: CPT | Performed by: NURSE PRACTITIONER

## 2025-06-03 PROCEDURE — 85652 RBC SED RATE AUTOMATED: CPT | Performed by: NURSE PRACTITIONER

## 2025-06-03 PROCEDURE — 82668 ASSAY OF ERYTHROPOIETIN: CPT | Performed by: NURSE PRACTITIONER

## 2025-06-03 RX ORDER — SEMAGLUTIDE 2.4 MG/.75ML
INJECTION, SOLUTION SUBCUTANEOUS
COMMUNITY

## 2025-06-03 NOTE — TELEPHONE ENCOUNTER
Spoke with patient regarding lab results from this AM. Phlebotomy is not required in current Hg/Hct range and verbalized understanding. Will await remainder of lab results.         Sidra Nunez, APRN

## 2025-06-03 NOTE — PROGRESS NOTES
DATE:  6/3/2025    REASON FOR REFERRAL: Leukocytosis    REFERRING PHYSICIAN:  JOSEFINA Coats    CHIEF COMPLAINT:  Chronic fatigue - stable      HISTORY OF PRESENT ILLNESS:   Iris Blackburn is a very pleasant 34 y.o. female who is being seen today at the request of JOSEFINA Coats for evaluation and treatment of leukocytosis. Patient reports that she was made aware of elevated WBC count around 1 month ago. She follows with JOSEFINA Patel every month with lab testing every 3 months and states that WBC count had been abnormal at last two lab checks. She states that she feels well overall. She endorses fatigue but states that this is chronic and unchanged. She denies fever, chills, drenching night sweats, tender/enlarged lymph nodes or unintentional weight loss. Upon further review of lab, she is also noted to have a polycythemia that has been ongoing for some time. She denies visual changes/disturbances, dizziness, pruritus after heat exposure. She is otherwise without specific complaints today.         PAST MEDICAL HISTORY:  Past Medical History:   Diagnosis Date    Anxiety     Iron deficiency     PCOS (polycystic ovarian syndrome)        PAST SURGICAL HISTORY:  Past Surgical History:   Procedure Laterality Date    DILATATION AND CURETTAGE         FAMILY HISTORY:  Family History   Problem Relation Age of Onset    Hyperlipidemia Mother     Hypertension Mother     Diabetes Mother     Hypertension Father     Hypertension Sister     Lupus Sister     Iron deficiency Sister     Hyperlipidemia Maternal Grandmother     Heart failure Maternal Grandmother        SOCIAL HISTORY:  Social History     Socioeconomic History    Marital status:    Tobacco Use    Smoking status: Every Day     Current packs/day: 1.00     Average packs/day: 1 pack/day for 15.0 years (15.0 ttl pk-yrs)     Types: Cigarettes   Vaping Use    Vaping status: Never Used   Substance and Sexual Activity    Alcohol use: Never    Drug use: Never     "Sexual activity: Yes     Partners: Male       MEDICATIONS:  The current medication list was reviewed in the EMR    Current Outpatient Medications:     Wegovy 2.4 MG/0.75ML solution auto-injector, ADMINISTER 2.4 MG UNDER THE SKIN 1 TIME A WEEK, Disp: , Rfl:     ALLERGIES:  No Known Allergies      REVIEW OF SYSTEMS:    A comprehensive 14 point review of systems was performed.  Significant findings as mentioned above.  All other systems reviewed and are negative.        Physical Exam  Vitals:    06/03/25 0903   BP: 133/96   Pulse: 89   Resp: 18   Temp: 97.7 °F (36.5 °C)   TempSrc: Temporal   SpO2: 99%   Weight: 78.9 kg (174 lb)   Height: 165.1 cm (65\")       ECOG score: 0   General/Constitutional: Awake, alert and oriented. No apparent acute distress is noted.  HEENT: Normocephalic, atraumatic. PERRLA, conjunctiva normal. Extraocular movements are intact.  Neck: No JVD, thyromegaly or cervical lymphadenopathy.  Cardiovascular: S1, S2. Regular rate and rhythm, no murmurs, rubs or gallops.  Respiratory: Pulmonary effort is normal, lungs are clear to auscultation bilaterally. No wheezing, rhonchi or rales. No use of accessory muscles, no retractions.  Abdomen: Soft, non-tender, non-distended with normoactive bowel sounds x 4 quadrants. No palpable hepatosplenomegaly.  Lymph: No cervical, supraclavicular, axillary, inguinal or femoral adenopathy.  Integumentary: Skin warm and dry. No bruising, ecchymosis.  Extremities: No clubbing, cyanosis or edema.  Neurological: Alert and oriented x 3. Grossly non-focal examination.    Pain Score:  Pain Score    06/03/25 0903   PainSc: 0-No pain       PHQ-Score Total:  PHQ-9 Total Score:          PATHOLOGY:        ENDOSCOPY:        IMAGING:  CT Abdomen Pelvis Without Contrast (10/18/2024 22:48)   Findings:     No bowel obstruction, free air, free fluid, or abscess.  No evidence of appendicitis.  No evidence of colitis or diverticulitis.  No grossly thickened small bowel loops.  No " evidence of pancreatitis.  No hydronephrosis or nephrolithiasis.  Contracted gallbladder without calcified stones.  Small retained fecal matter scattered throughout the colon and rectum.  Visualized lung bases are clear.     IMPRESSION:  Impression:     1.  No bowel obstruction, free air, free fluid, or abscess.  2.  No acute inflammatory process in the abdomen or pelvis.    LABS:      RECENT LABS:  Lab Results   Component Value Date    WBC 11.33 (H) 06/03/2025    HGB 16.6 (H) 06/03/2025    HCT 50.1 (H) 06/03/2025    MCV 89.0 06/03/2025    RDW 13.4 06/03/2025     06/03/2025    NEUTRORELPCT 68.8 06/03/2025    LYMPHORELPCT 24.4 06/03/2025    MONORELPCT 4.9 (L) 06/03/2025    EOSRELPCT 1.3 06/03/2025    BASORELPCT 0.4 06/03/2025    NEUTROABS 7.78 (H) 06/03/2025    LYMPHSABS 2.77 06/03/2025     Lab Results   Component Value Date     06/03/2025    K 4.5 06/03/2025    CO2 22.5 06/03/2025     06/03/2025    BUN 5.4 (L) 06/03/2025    CREATININE 0.67 06/03/2025    GLUCOSE 122 (H) 06/03/2025    CALCIUM 8.5 (L) 06/03/2025    ALKPHOS 169 (H) 06/03/2025    AST 23 06/03/2025    ALT 42 (H) 06/03/2025    BILITOT 0.3 06/03/2025    ALBUMIN 4.1 06/03/2025    PROTEINTOT 6.9 06/03/2025    MG 2.3 (H) 11/13/2024       ASSESSMENT & PLAN:  Iris Blackburn is a very pleasant 34 y.o. female with    1. Leukocytosis  - Previous available CBCs were reviewed for comparison of trend and it appears that patient has had ongoing leukocytosis (predominantly neutrophilia) since at least September 2023 with WBC 11.7-19.48. On occasions, she has had mild elevation of lymphocyte count. RBC have been elevated since September 2024 (see below). Platelet count is within normal limits.  - She denies recent/recurrent infections, frequent use of steroids.   - She reports that she is currently using tobacco and smokes approximately 1 pack per day.   - CBC today with WBC 11.33 (ANC 7.78), Hg 16.6, Hct 50.1 and platelet count 220,000.  - Additional  labs to further evaluate leukocytosis including ESR, CRP, TING, rheumatoid factor, BCR/ABL, peripheral blood smear and flow cytometry are currently pending.  - She will follow up in 3 weeks with repeat CBCD. In the interim, she is aware that if she has any issues to arise or any concerns that she should notify our office and I would be happy to see her sooner.    2. Polycythemia  - Noted to have polycythemia upon review of labs since at least September 2024 with Hg 16.2-17, Hct 48.9-50.3.  - She endorses tobacco abuse but denies history of underlying lung disorder, sleep apnea.  - She admits that she does not drink enough water so dehydration could be contributing.  - CBC today with WBC 11.33 (ANC 7.78), Hg 16.6, Hct 50.1 and platelet count 220,000.  - Possible polycythemia vera (PV) diagnosis, as well as prognosis and treatment were reviewed with the patient today. She understands that if she does have polycythemia vera, she is at increased risk for thrombosis, as well as bleeding, hematological malignancies, and post-PV myelofibrosis.   - She is aware that the goal Hct for primary polycythemia is <45% and secondary polycythemia is <52%. However, phlebotomy is not indicated in her current Hg/Hct range.  - Will order for abdominal US to further evaluate for splenomegaly.  - Additional labs to further evaluate including erythropoietin, BCR/ABL and Jak2 [id number] w/ reflex to Exon 12-15, CALR + MPL were obtained and are pending.  - I suspect that polycythemia may be secondary in etiology due to history of tobacco use, as well as poor hydration but will await workup as above.  - She will follow up in 3 weeks as above. I encouraged her to hydrate well for 3-4 days prior to her appointment and verbalized understanding.    3. Tobacco abuse disorder  - She has a 15 pack-year history of smoking. Encouraged smoking cessation and offered referral to smoking cessation program but declines at this time.        ACO / CARY/Other   Quality measures  -  Iris Blackburn did not receive 2024 flu vaccine.  This was recommended but declined.  -  Iris Blackburn reports a pain score of 0.   -  Current outpatient and discharge medications have been reconciled for the patient.  Reviewed by: JOSEFINA Ortega      The patient was in agreement with the plan and all questions were answered to her satisfaction.     Thank you so much for allowing us to participate in the care of Iris Blackburn . Please do not hesitate to contact us with any questions or concerns.     A total of 45 minutes were spent coordinating this patient’s care in clinic today; more than 50% of this time was face-to-face with the patient, reviewing her interim medical history and counseling on the current treatment and followup plan. All questions were answered to her satisfaction.          Electronically Signed by: JOSEFINA Chaudhary         CC:   JOSEFINA Coats Theresa, APRN

## 2025-06-03 NOTE — TELEPHONE ENCOUNTER
Caller: Iris Blackburn    Relationship: Self    Best call back number: [phone]      What was the call regarding: PT REQUESTING CB REGARDING LAB RESULTS FROM THIS MORNING

## 2025-06-04 LAB
ANA SER QL: NEGATIVE
EPO SERPL-ACNC: 8.5 MIU/ML (ref 2.6–18.5)
REF LAB TEST METHOD: NORMAL
REF LAB TEST METHOD: NORMAL

## 2025-06-08 LAB
LAB DIRECTOR NAME PROVIDER: NORMAL
LABORATORY COMMENT REPORT: NORMAL
OBSERVATION IMP: NEGATIVE
REF LAB TEST METHOD: NORMAL
T(ABL1,BCR)B2A2/CONTROL BLD/T: NORMAL %
T(ABL1,BCR)B3A2/CONTROL BLD/T: NORMAL %
T(ABL1,BCR)E1A2/CONTROL BLD/T: NORMAL %
T(ABL1,BCR)E1A2/CONTROL BLD/T: NORMAL %

## 2025-06-09 ENCOUNTER — PATIENT ROUNDING (BHMG ONLY) (OUTPATIENT)
Dept: ONCOLOGY | Facility: CLINIC | Age: 35
End: 2025-06-09
Payer: COMMERCIAL

## 2025-06-09 LAB
CALR EXON 9 MUT ANL BLD/T: NORMAL
CITATION REF LAB TEST: NORMAL
JAK2 GENE MUT ANL BLD/T: NORMAL
JAK2 P.V617F BLD/T QL: NORMAL
LAB DIRECTOR NAME PROVIDER: NORMAL
MPL GENE MUT TESTED MAR: NORMAL
REF LAB TEST METHOD: NORMAL
REFLEX: NORMAL
SPECIMEN TYPE: NORMAL
TEST PERFORMANCE INFO SPEC: NORMAL

## 2025-06-12 ENCOUNTER — HOSPITAL ENCOUNTER (OUTPATIENT)
Facility: HOSPITAL | Age: 35
Discharge: HOME | End: 2025-06-12
Admitting: NURSE PRACTITIONER
Payer: COMMERCIAL

## 2025-06-12 DIAGNOSIS — D75.1 POLYCYTHEMIA: ICD-10-CM

## 2025-06-12 DIAGNOSIS — D72.829 LEUKOCYTOSIS, UNSPECIFIED TYPE: ICD-10-CM

## 2025-06-12 PROCEDURE — 76700 US EXAM ABDOM COMPLETE: CPT

## 2025-06-12 PROCEDURE — 76700 US EXAM ABDOM COMPLETE: CPT | Performed by: RADIOLOGY

## 2025-06-13 ENCOUNTER — TELEPHONE (OUTPATIENT)
Dept: ONCOLOGY | Facility: CLINIC | Age: 35
End: 2025-06-13
Payer: COMMERCIAL

## 2025-06-13 NOTE — TELEPHONE ENCOUNTER
Spoke with patient regarding ultrasound results. No hepatosplenomegaly identified on US. Advised to keep follow up appointment as scheduled.      JOSEFINA Ortega

## 2025-06-13 NOTE — TELEPHONE ENCOUNTER
Caller: DedrickStella bellege    Relationship: Self    Best call back number: [phone]    Caller requesting test results: PT    What test was performed: ULTRA SOUND    When was the test performed: 6-12    Where was the test performed:

## 2025-07-10 ENCOUNTER — TELEPHONE (OUTPATIENT)
Dept: ONCOLOGY | Facility: CLINIC | Age: 35
End: 2025-07-10